# Patient Record
Sex: FEMALE | Race: WHITE | HISPANIC OR LATINO | URBAN - METROPOLITAN AREA
[De-identification: names, ages, dates, MRNs, and addresses within clinical notes are randomized per-mention and may not be internally consistent; named-entity substitution may affect disease eponyms.]

---

## 2020-10-06 ENCOUNTER — OFFICE VISIT (OUTPATIENT)
Dept: URBAN - METROPOLITAN AREA CLINIC 121 | Facility: CLINIC | Age: 40
End: 2020-10-06

## 2021-04-16 ENCOUNTER — OFFICE VISIT (OUTPATIENT)
Dept: URBAN - METROPOLITAN AREA CLINIC 63 | Facility: CLINIC | Age: 41
End: 2021-04-16

## 2021-06-15 ENCOUNTER — OFFICE VISIT (OUTPATIENT)
Dept: URBAN - METROPOLITAN AREA CLINIC 60 | Facility: CLINIC | Age: 41
End: 2021-06-15

## 2022-07-09 ENCOUNTER — TELEPHONE ENCOUNTER (OUTPATIENT)
Dept: URBAN - METROPOLITAN AREA CLINIC 121 | Facility: CLINIC | Age: 42
End: 2022-07-09

## 2022-07-09 RX ORDER — OMEPRAZOLE 20 MG/1
ONCE A DAY CAPSULE, DELAYED RELEASE ORAL ONCE A DAY
Refills: 3 | OUTPATIENT
Start: 2021-04-16 | End: 2021-06-15

## 2022-07-09 RX ORDER — CETIRIZINE HYDROCHLORIDE 10 MG/1
CAPSULE, LIQUID FILLED ORAL ONCE A DAY
Refills: 0 | OUTPATIENT
Start: 2021-04-16 | End: 2021-06-15

## 2022-07-09 RX ORDER — DOCUSATE SODIUM 100 MG
TABLET ORAL AS NEEDED
Refills: 0 | OUTPATIENT
Start: 2017-05-23 | End: 2021-04-16

## 2022-07-09 RX ORDER — MOMETASONE 50 UG/1
SPRAY, METERED NASAL
Refills: 0 | OUTPATIENT
Start: 2021-05-19 | End: 2021-06-15

## 2022-07-09 RX ORDER — OMEPRAZOLE 20 MG/1
ONCE A DAY CAPSULE, DELAYED RELEASE ORAL ONCE A DAY
Refills: 3 | OUTPATIENT
Start: 2017-03-28 | End: 2017-03-28

## 2022-07-09 RX ORDER — OMEPRAZOLE 20 MG/1
ONCE A DAY CAPSULE, DELAYED RELEASE ORAL ONCE A DAY
Refills: 3 | OUTPATIENT
Start: 2017-05-11 | End: 2021-04-16

## 2022-07-09 RX ORDER — LORATADINE 10 MG
TABLET,DISINTEGRATING ORAL TAKE AS DIRECTED
Refills: 0 | OUTPATIENT
Start: 2017-05-23 | End: 2021-04-16

## 2022-07-10 ENCOUNTER — TELEPHONE ENCOUNTER (OUTPATIENT)
Dept: URBAN - METROPOLITAN AREA CLINIC 121 | Facility: CLINIC | Age: 42
End: 2022-07-10

## 2022-07-10 RX ORDER — MOMETASONE 50 UG/1
SPRAY, METERED NASAL
Refills: 0 | Status: ACTIVE | COMMUNITY
Start: 2021-06-15

## 2022-07-10 RX ORDER — FAMOTIDINE 40 MG/1
ONCE A DAY AT NIGHT TABLET, FILM COATED ORAL ONCE A DAY
Refills: 1 | Status: ACTIVE | COMMUNITY
Start: 2021-06-15

## 2022-07-10 RX ORDER — LINACLOTIDE 145 UG/1
ONCE A DAY CAPSULE, GELATIN COATED ORAL ONCE A DAY
Refills: 1 | Status: ACTIVE | COMMUNITY
Start: 2017-05-23

## 2022-07-10 RX ORDER — ESOMEPRAZOLE MAGNESIUM 40 MG/1
ONCE A DAY CAPSULE, DELAYED RELEASE ORAL ONCE A DAY
Refills: 0 | Status: ACTIVE | COMMUNITY
Start: 2021-04-20

## 2022-07-10 RX ORDER — CETIRIZINE HYDROCHLORIDE 10 MG/1
CAPSULE, LIQUID FILLED ORAL ONCE A DAY
Refills: 0 | Status: ACTIVE | COMMUNITY
Start: 2021-06-15

## 2023-06-27 ENCOUNTER — DASHBOARD ENCOUNTERS (OUTPATIENT)
Age: 43
End: 2023-06-27

## 2023-06-27 ENCOUNTER — OFFICE VISIT (OUTPATIENT)
Dept: URBAN - METROPOLITAN AREA CLINIC 60 | Facility: CLINIC | Age: 43
End: 2023-06-27
Payer: COMMERCIAL

## 2023-06-27 ENCOUNTER — WEB ENCOUNTER (OUTPATIENT)
Dept: URBAN - METROPOLITAN AREA CLINIC 60 | Facility: CLINIC | Age: 43
End: 2023-06-27

## 2023-06-27 VITALS
HEIGHT: 66 IN | RESPIRATION RATE: 12 BRPM | SYSTOLIC BLOOD PRESSURE: 120 MMHG | DIASTOLIC BLOOD PRESSURE: 76 MMHG | OXYGEN SATURATION: 98 % | TEMPERATURE: 98.2 F | BODY MASS INDEX: 27.77 KG/M2 | WEIGHT: 172.8 LBS | HEART RATE: 77 BPM

## 2023-06-27 DIAGNOSIS — Z12.11 SCREEN FOR COLON CANCER: ICD-10-CM

## 2023-06-27 DIAGNOSIS — K21.9 GERD WITHOUT ESOPHAGITIS: ICD-10-CM

## 2023-06-27 DIAGNOSIS — R11.2 NAUSEA AND VOMITING IN ADULT: ICD-10-CM

## 2023-06-27 PROBLEM — 16932000: Status: ACTIVE | Noted: 2023-06-27

## 2023-06-27 PROBLEM — 266435005: Status: ACTIVE | Noted: 2023-06-27

## 2023-06-27 PROCEDURE — 99214 OFFICE O/P EST MOD 30 MIN: CPT | Performed by: INTERNAL MEDICINE

## 2023-06-27 RX ORDER — FAMOTIDINE 20 MG/1
TAKE ONE TABLET BY MOUTH ONE TIME DAILY TABLET, FILM COATED ORAL
Qty: 20 UNSPECIFIED | Refills: 0 | Status: ACTIVE | COMMUNITY

## 2023-06-27 RX ORDER — ONDANSETRON 4 MG/1
1 TABLET ON THE TONGUE AND ALLOW TO DISSOLVE TABLET, ORALLY DISINTEGRATING ORAL ONCE A DAY
Qty: 30 | OUTPATIENT
Start: 2023-06-27

## 2023-06-27 RX ORDER — FAMOTIDINE 20 MG/1
1 TABLET AT BEDTIME AS NEEDED TABLET, FILM COATED ORAL TWICE A DAY
Qty: 60 TABLET | Refills: 3 | OUTPATIENT
Start: 2023-06-27

## 2023-06-27 NOTE — HPI-TODAY'S VISIT:
Jennifer is a pleasant 43-year-old female who is here today for evaluation of nausea vomiting. Recently got back from a cruise to [place] and apparently found that she had a roof leak in her house and the air conditioning was not working.  She thinks the stress caused her symptoms.  She was unable to keep anything down and was taken to the ER 2 days ago.  She was given fluids.  Labs were unremarkable.  No CAT scan done.  She was discharged after she felt better when she got GI cocktail including Pepcid, Reglan, Carafate. Continues to report some nausea currently tolerating only liquids.  Denies use of NSAIDs.  Complains of severe epigastric burning.  Pepcid seems to help her.  Denies any dysphagia early satiety or bloating.  Denies any diarrhea rectal bleeding or melena.  Denies any fevers or chills or myalgias.  No other sick contacts. History of cholecystectomy.

## 2023-06-27 NOTE — HPI-PREVIOUS IMAGING
Ultrasound May 2021: Normal liver without any focal lesions, cholecystectomy, normal kidneys CAT scan 2017: Fecal stasis, cholecystectomy, normal pancreas

## 2023-06-27 NOTE — HPI-PREVIOUS PROCEDURES
Colonoscopy 2017: Good prep, decreased finger tone, normal terminal ileum, redundant colon, grade 2 hemorrhoids.  No specimens collected-recall 10 years Upper endoscopy 2017: LA grade A erosive esophagitis, 2 cm hiatal hernia, H. pylori negative gastritis, normal ampulla and normal duodenal bulb with unremarkable duodenal biopsies

## 2023-07-10 ENCOUNTER — TELEPHONE ENCOUNTER (OUTPATIENT)
Dept: URBAN - METROPOLITAN AREA CLINIC 60 | Facility: CLINIC | Age: 43
End: 2023-07-10

## 2024-01-02 ENCOUNTER — TELEPHONE ENCOUNTER (OUTPATIENT)
Dept: URBAN - METROPOLITAN AREA CLINIC 60 | Facility: CLINIC | Age: 44
End: 2024-01-02

## 2024-01-02 RX ORDER — PANTOPRAZOLE SODIUM 40 MG/1
1 TABLET TABLET, DELAYED RELEASE ORAL ONCE A DAY
Qty: 30 | Refills: 5 | OUTPATIENT
Start: 2024-01-02

## 2024-07-11 ENCOUNTER — TELEPHONE ENCOUNTER (OUTPATIENT)
Dept: URBAN - METROPOLITAN AREA CLINIC 60 | Facility: CLINIC | Age: 44
End: 2024-07-11

## 2024-07-11 RX ORDER — PANTOPRAZOLE SODIUM 40 MG/1
1 TABLET TABLET, DELAYED RELEASE ORAL ONCE A DAY
Qty: 30 | Refills: 0
Start: 2024-01-02

## 2024-07-11 RX ORDER — PANTOPRAZOLE SODIUM 40 MG/1
1 TABLET TABLET, DELAYED RELEASE ORAL ONCE A DAY
Qty: 90 TABLET | Refills: 1
Start: 2024-01-02

## 2024-07-15 ENCOUNTER — ERX REFILL RESPONSE (OUTPATIENT)
Dept: URBAN - METROPOLITAN AREA CLINIC 60 | Facility: CLINIC | Age: 44
End: 2024-07-15

## 2024-07-15 RX ORDER — PANTOPRAZOLE SODIUM 40 MG/1
TAKE ONE TABLET BY MOUTH ONE TIME DAILY TABLET, DELAYED RELEASE ORAL
Qty: 30 TABLET | Refills: 5 | OUTPATIENT

## 2024-07-15 RX ORDER — PANTOPRAZOLE SODIUM 40 MG/1
1 TABLET TABLET, DELAYED RELEASE ORAL ONCE A DAY
Qty: 30 | Refills: 0 | OUTPATIENT

## 2024-08-27 ENCOUNTER — TELEPHONE ENCOUNTER (OUTPATIENT)
Dept: URBAN - METROPOLITAN AREA CLINIC 64 | Facility: CLINIC | Age: 44
End: 2024-08-27

## 2024-12-03 ENCOUNTER — OFFICE VISIT (OUTPATIENT)
Dept: URBAN - METROPOLITAN AREA CLINIC 60 | Facility: CLINIC | Age: 44
End: 2024-12-03
Payer: COMMERCIAL

## 2024-12-03 VITALS
OXYGEN SATURATION: 100 % | SYSTOLIC BLOOD PRESSURE: 128 MMHG | WEIGHT: 182.6 LBS | BODY MASS INDEX: 29.35 KG/M2 | TEMPERATURE: 98.4 F | DIASTOLIC BLOOD PRESSURE: 68 MMHG | HEART RATE: 72 BPM | HEIGHT: 66 IN | RESPIRATION RATE: 12 BRPM

## 2024-12-03 DIAGNOSIS — K21.9 GERD WITHOUT ESOPHAGITIS: ICD-10-CM

## 2024-12-03 DIAGNOSIS — Z12.11 SCREEN FOR COLON CANCER: ICD-10-CM

## 2024-12-03 DIAGNOSIS — R63.4 WEIGHT LOSS: ICD-10-CM

## 2024-12-03 DIAGNOSIS — R11.2 NAUSEA AND VOMITING IN ADULT: ICD-10-CM

## 2024-12-03 PROCEDURE — 99214 OFFICE O/P EST MOD 30 MIN: CPT | Performed by: INTERNAL MEDICINE

## 2024-12-03 RX ORDER — FLUTICASONE PROPIONATE 50 UG/1
USE ONE SPRAY IN EACH NOSTRIL TWICE DAILY SPRAY, METERED NASAL
Qty: 16 UNSPECIFIED | Refills: 0 | Status: ACTIVE | COMMUNITY

## 2024-12-03 RX ORDER — ONDANSETRON 4 MG/1
1 TABLET ON THE TONGUE AND ALLOW TO DISSOLVE TABLET, ORALLY DISINTEGRATING ORAL ONCE A DAY
Qty: 30 | Refills: 3 | OUTPATIENT

## 2024-12-03 RX ORDER — PANTOPRAZOLE SODIUM 40 MG/1
TAKE ONE TABLET BY MOUTH ONE TIME DAILY TABLET, DELAYED RELEASE ORAL ONCE A DAY
Qty: 90 TABLET | Refills: 1

## 2024-12-03 RX ORDER — PANTOPRAZOLE SODIUM 40 MG/1
TAKE ONE TABLET BY MOUTH ONE TIME DAILY TABLET, DELAYED RELEASE ORAL
Qty: 30 TABLET | Refills: 5 | Status: ACTIVE | COMMUNITY

## 2024-12-03 NOTE — HPI-TODAY'S VISIT:
Jennifer is here today in follow-up. Apparently she was on a cruise to [place] and just got back and felt ill after she came off the ship with persistent nausea and diarrhea. She is starting to feel better. She is hydrating with Pedialyte. She is on a brat diet. Continues to have persistent nausea. History of cholecystectomy. Upper endoscopy 2017 findings as noted below. Denies any history of chronic NSAID use. She has a history of chronic sinus issues and vertigo. In the past has been seen by ENT. She is feeling better. No fevers or chills. No abdominal pain. No rectal bleeding. Pantoprazole 40 mg has helped her considerably with her acid reflux. Reports no dysphagia.   She was seen a year ago with a similar complaints when she got off another cruise ( [place] )